# Patient Record
Sex: FEMALE | Race: WHITE
[De-identification: names, ages, dates, MRNs, and addresses within clinical notes are randomized per-mention and may not be internally consistent; named-entity substitution may affect disease eponyms.]

---

## 2017-06-06 NOTE — OR
PREOPERATIVE DIAGNOSIS:  History of polyps.

 

POSTOPERATIVE DIAGNOSIS:  Essentially normal colonoscopic exam.

 

PROCEDURE PROPOSED:  Total flexible colonoscopy.

 

PROCEDURE DONE:  Total flexible colonoscopy.

 

INDICATION:  This is a 66-year-old female, who comes in for colonic surveillance

due to history of polyps.  This should be about her 3rd colonoscopy.  She had

polyps on each of the 2 previous colonoscopies.

 

TECHNIQUE:  The patient was brought to the endoscopy suite, placed in left

lateral decubitus position.  She was sedated with propofol per CRNA.  The

flexible video colonoscope was then passed transanally and under visualization

advanced to the cecum.  Examination revealed normal ascending, transverse,

descending, sigmoid, and rectal colon.  There was no evidence of any

diverticulosis, polyps, colitis, or any other abnormalities.  The scope was then

withdrawn.  The patient tolerated the procedure well.

 

IMPRESSION:  Essentially normal colonoscopic exam.

 

PLAN:  The patient is reassured.  I felt that she should continue with colonic

surveillance every 5 years hereafter due to her history of polyps.

 

 

SCM:  06/06/2017 12:05:47  MODL:  06/06/2017 14:12:32

Job #:  [id number]

## 2022-07-07 ENCOUNTER — HOSPITAL ENCOUNTER (OUTPATIENT)
Dept: HOSPITAL 50 - VM.SDS | Age: 72
Discharge: HOME | End: 2022-07-07
Attending: FAMILY MEDICINE
Payer: MEDICARE

## 2022-07-07 VITALS — SYSTOLIC BLOOD PRESSURE: 102 MMHG | HEART RATE: 57 BPM | DIASTOLIC BLOOD PRESSURE: 69 MMHG

## 2022-07-07 DIAGNOSIS — E78.00: ICD-10-CM

## 2022-07-07 DIAGNOSIS — E66.01: ICD-10-CM

## 2022-07-07 DIAGNOSIS — Z12.11: Primary | ICD-10-CM

## 2022-07-07 DIAGNOSIS — Z80.0: ICD-10-CM

## 2022-07-07 DIAGNOSIS — Z98.890: ICD-10-CM

## 2022-07-07 DIAGNOSIS — Z87.891: ICD-10-CM

## 2022-07-07 DIAGNOSIS — N18.31: ICD-10-CM

## 2022-07-07 DIAGNOSIS — D12.3: ICD-10-CM

## 2022-07-07 DIAGNOSIS — Z79.899: ICD-10-CM

## 2022-07-07 DIAGNOSIS — R73.9: ICD-10-CM

## 2022-07-07 DIAGNOSIS — I12.9: ICD-10-CM

## 2022-07-07 DIAGNOSIS — M85.80: ICD-10-CM
